# Patient Record
Sex: FEMALE | Race: BLACK OR AFRICAN AMERICAN | NOT HISPANIC OR LATINO
[De-identification: names, ages, dates, MRNs, and addresses within clinical notes are randomized per-mention and may not be internally consistent; named-entity substitution may affect disease eponyms.]

---

## 2024-07-22 ENCOUNTER — RESULT REVIEW (OUTPATIENT)
Age: 39
End: 2024-07-22

## 2024-07-24 ENCOUNTER — TRANSCRIPTION ENCOUNTER (OUTPATIENT)
Age: 39
End: 2024-07-24

## 2024-08-05 ENCOUNTER — NON-APPOINTMENT (OUTPATIENT)
Age: 39
End: 2024-08-05

## 2024-08-06 ENCOUNTER — APPOINTMENT (OUTPATIENT)
Dept: NEUROLOGY | Facility: CLINIC | Age: 39
End: 2024-08-06

## 2024-08-06 PROBLEM — Z00.00 ENCOUNTER FOR PREVENTIVE HEALTH EXAMINATION: Status: ACTIVE | Noted: 2024-08-06

## 2024-08-06 PROCEDURE — G2211 COMPLEX E/M VISIT ADD ON: CPT

## 2024-08-06 PROCEDURE — 99203 OFFICE O/P NEW LOW 30 MIN: CPT

## 2024-08-13 NOTE — PHYSICAL EXAM
[General Appearance - Alert] : alert [General Appearance - In No Acute Distress] : in no acute distress [Oriented To Time, Place, And Person] : oriented to person, place, and time [Impaired Insight] : insight and judgment were intact [Affect] : the affect was normal [Person] : oriented to person [Place] : oriented to place [Time] : oriented to time [Cranial Nerves Optic (II)] : visual acuity intact bilaterally,  visual fields full to confrontation, pupils equal round and reactive to light [Cranial Nerves Oculomotor (III)] : extraocular motion intact [Cranial Nerves Trigeminal (V)] : facial sensation intact symmetrically [Cranial Nerves Facial (VII)] : face symmetrical [Cranial Nerves Vestibulocochlear (VIII)] : hearing was intact bilaterally [Cranial Nerves Glossopharyngeal (IX)] : tongue and palate midline [Cranial Nerves Accessory (XI - Cranial And Spinal)] : head turning and shoulder shrug symmetric [Cranial Nerves Hypoglossal (XII)] : there was no tongue deviation with protrusion [Sensation Tactile Decrease] : light touch was intact [Abnormal Walk] : normal gait [2+] : Brachioradialis left 2+ [Sclera] : the sclera and conjunctiva were normal [Extraocular Movements] : extraocular movements were intact [Outer Ear] : the ears and nose were normal in appearance [Hearing Threshold Finger Rub Not [place]] : hearing was normal [Neck Appearance] : the appearance of the neck was normal [Skin Color & Pigmentation] : normal skin color and pigmentation

## 2024-08-13 NOTE — ASSESSMENT
[FreeTextEntry1] : Ms Ene Azevedo us a 39yo Female w/ HTN, HLD, Lupus, ESRD (MWF) on HD presenting due to first-time seizure activity w/ associated headaches   Plan continue to monitor of ASM reviewed seizure triggers Proscribed [place] law follow up with Dr Fam in 3 months

## 2024-08-13 NOTE — HISTORY OF PRESENT ILLNESS
[FreeTextEntry1] : ***8/6/2024*** Ms Ene Azevedo is here today for an initial visit after hospitalization as per below:   Hospital Course:[facility] Discharge Date 24-Jul-2024 Admission Date 18-Jul-2024 18:13 Reason for Admission Seizure Hospital Course HPI: 37yo Female w/ HTN, HLD, Lupus, ESRD (MWF) on HD presenting due to first-time seizure activity w/ associated headaches. From chart review daughter reports that at around 12:00 PM on 7/18/2024, she witnessed her mother foaming at the mouth with her entire body shaking for about 1 minute. The symptoms stopped without giving any medications. Patient bit her tongue, lost bowel and bladder control and remained confused afterward. When patient awoke, she was still at home. Patient reports feeling frontal headache earlier in day starting at around 9:30 AM so she left work early but denies any feelings that she was going to lose consciousness. Patient states that she currently feels tired. Family friend notes that patient missed HD session on 7/16/2024 so went for HD on 7/17/2024 and missed today's HD session. Prior to this episode, patient reports being in her normal state of health. Upon review of systems patient reports no photophobia, blurry vision, chest pain, palpitations, diarrhea constipation or dysuria. Patient reports no recent travel, no new medications, no toxic substances. Patient does not have any seizure history, no history of febrile seizures, no developmental issues, unknown family history of seizures, and no history of trauma.  At home, patient lives with daughter (11yo) and sister. She works at [employer]. No smoking or alcohol use. Ambulates without assistance.  Upon arrival to the ED: Patient AF, HR 80s, BP 200s/110, RA Patient had a seizure in the ED, requiring ativan. Labetalol 10mg was given for hypertension. (18 Jul 2024 20:05)  Hospital Course: Patient admitted for seizure management iso of Hypertensive emergency. Nephrology was consulted at the ED. Patient underwent emergent dialysis on 7/18/24 and underwent HD as per renal. Hospitalization complicated by difficult to control blood pressure with countless SBP > 180. Patient remained asymptomatic during the course. Patient was initially started on all of her home bp medication including coreg 12.5mg BID, hydralazine 50mg TID, nifedipine 90qd, Torsemide 100mg. BP titration: Coreg was titrated to 25mg BID, hydral 75mg TID with initiation of lisinopril 10mg. Patient remained normotensive thereafter. Neurology was consulted for seizure management. Patient was no in acute lupus flare. EEG showed no epileptiform discharges and MRI Brain seizure protocol was negative for anatomical abnormalities. Patient was discharge with improve blood pressure control with plans to follow up with her nephrologist, and Primary care physician.   Important Medication Changes and Reason: 1. increase coreg from 12.5mg BID to 25 mg BID 2.increase hydralazine from 50mg TID to 100 mg TID 3. start taking lisinopril 10 mg daily  Active or Pending Issues Requiring Follow-up: Continue to go to your dialysis sessions Follow up with your physicians  Advanced Directives: [x] Full code [ ] DNR [ ] Hospice  Discharge Diagnoses: Hypertensive Emergency, seizure     Med Reconciliation: Override IMPROVE-DD recommendations due to: IMPROVE-DD Application Not Available Recommended Post-Discharge VTE Prophylaxis IMPROVE-DD Application Not Available Medication Reconciliation Status Admission Reconciliation is Completed Discharge Reconciliation is Completed Discharge Medications calcium acetate: 667 milligram(s) orally 3 times a day carvedilol 25 mg oral tablet: 1 tab(s) orally every 12 hours hydrALAZINE 100 mg oral tablet: 1 tab(s) orally 3 times a day lisinopril 10 mg oral tablet: 1 tab(s) orally once a day mycophenolate mofetil 500 mg oral tablet: 1 tab(s) orally 2 times a day NIFEdipine 90 mg oral tablet, extended release: 1 tab(s) orally once a day torsemide 100 mg oral tablet: 1 tab(s) orally once a day , ,

## 2024-08-13 NOTE — ASSESSMENT
[FreeTextEntry1] : Ms Ene Azevedo us a 37yo Female w/ HTN, HLD, Lupus, ESRD (MWF) on HD presenting due to first-time seizure activity w/ associated headaches   Plan continue to monitor of ASM reviewed seizure triggers [place] follow up with Dr Fam in 3 months

## 2024-08-13 NOTE — HISTORY OF PRESENT ILLNESS
[FreeTextEntry1] : ***8/6/2024*** Ms Ene Azevedo is here today for an initial visit after hospitalization as per below:   Hospital Course:[facility] Discharge Date 24-Jul-2024 Admission Date 18-Jul-2024 18:13 Reason for Admission Seizure Hospital Course HPI: 39yo Female w/ HTN, HLD, Lupus, ESRD (MWF) on HD presenting due to first-time seizure activity w/ associated headaches. From chart review daughter reports that at around 12:00 PM on 7/18/2024, she witnessed her mother foaming at the mouth with her entire body shaking for about 1 minute. The symptoms stopped without giving any medications. Patient bit her tongue, lost bowel and bladder control and remained confused afterward. When patient awoke, she was still at home. Patient reports feeling frontal headache earlier in day starting at around 9:30 AM so she left work early but denies any feelings that she was going to lose consciousness. Patient states that she currently feels tired. Family friend notes that patient missed HD session on 7/16/2024 so went for HD on 7/17/2024 and missed today's HD session. Prior to this episode, patient reports being in her normal state of health. Upon review of systems patient reports no photophobia, blurry vision, chest pain, palpitations, diarrhea constipation or dysuria. Patient reports no recent travel, no new medications, no toxic substances. Patient does not have any seizure history, no history of febrile seizures, no developmental issues, unknown family history of seizures, and no history of trauma.  At home, patient lives with daughter (9yo) and sister. She works at [employer]. No smoking or alcohol use. Ambulates without assistance.  Upon arrival to the ED: Patient AF, HR 80s, BP 200s/110, RA Patient had a seizure in the ED, requiring ativan. Labetalol 10mg was given for hypertension. (18 Jul 2024 20:05)  Hospital Course: Patient admitted for seizure management iso of Hypertensive emergency. Nephrology was consulted at the ED. Patient underwent emergent dialysis on 7/18/24 and underwent HD as per renal. Hospitalization complicated by difficult to control blood pressure with countless SBP > 180. Patient remained asymptomatic during the course. Patient was initially started on all of her home bp medication including coreg 12.5mg BID, hydralazine 50mg TID, nifedipine 90qd, Torsemide 100mg. BP titration: Coreg was titrated to 25mg BID, hydral 75mg TID with initiation of lisinopril 10mg. Patient remained normotensive thereafter. Neurology was consulted for seizure management. Patient was no in acute lupus flare. EEG showed no epileptiform discharges and MRI Brain seizure protocol was negative for anatomical abnormalities. Patient was discharge with improve blood pressure control with plans to follow up with her nephrologist, and Primary care physician.   Important Medication Changes and Reason: 1. increase coreg from 12.5mg BID to 25 mg BID 2.increase hydralazine from 50mg TID to 100 mg TID 3. start taking lisinopril 10 mg daily  Active or Pending Issues Requiring Follow-up: Continue to go to your dialysis sessions Follow up with your physicians  Advanced Directives: [x] Full code [ ] DNR [ ] Hospice  Discharge Diagnoses: Hypertensive Emergency, seizure     Med Reconciliation: Override IMPROVE-DD recommendations due to: IMPROVE-DD Application Not Available Recommended Post-Discharge VTE Prophylaxis IMPROVE-DD Application Not Available Medication Reconciliation Status Admission Reconciliation is Completed Discharge Reconciliation is Completed Discharge Medications calcium acetate: 667 milligram(s) orally 3 times a day carvedilol 25 mg oral tablet: 1 tab(s) orally every 12 hours hydrALAZINE 100 mg oral tablet: 1 tab(s) orally 3 times a day lisinopril 10 mg oral tablet: 1 tab(s) orally once a day mycophenolate mofetil 500 mg oral tablet: 1 tab(s) orally 2 times a day NIFEdipine 90 mg oral tablet, extended release: 1 tab(s) orally once a day torsemide 100 mg oral tablet: 1 tab(s) orally once a day , ,

## 2024-08-13 NOTE — PHYSICAL EXAM
[General Appearance - Alert] : alert [General Appearance - In No Acute Distress] : in no acute distress [Oriented To Time, Place, And Person] : oriented to person, place, and time [Impaired Insight] : insight and judgment were intact [Affect] : the affect was normal [Person] : oriented to person [Place] : oriented to place [Time] : oriented to time [Cranial Nerves Optic (II)] : visual acuity intact bilaterally,  visual fields full to confrontation, pupils equal round and reactive to light [Cranial Nerves Oculomotor (III)] : extraocular motion intact [Cranial Nerves Trigeminal (V)] : facial sensation intact symmetrically [Cranial Nerves Facial (VII)] : face symmetrical [Cranial Nerves Vestibulocochlear (VIII)] : hearing was intact bilaterally [Cranial Nerves Glossopharyngeal (IX)] : tongue and palate midline [Cranial Nerves Accessory (XI - Cranial And Spinal)] : head turning and shoulder shrug symmetric [Cranial Nerves Hypoglossal (XII)] : there was no tongue deviation with protrusion [Abnormal Walk] : normal gait [Sensation Tactile Decrease] : light touch was intact [2+] : Brachioradialis left 2+ [Sclera] : the sclera and conjunctiva were normal [Extraocular Movements] : extraocular movements were intact [Outer Ear] : the ears and nose were normal in appearance [Hearing Threshold Finger Rub Not [place]] : hearing was normal [Neck Appearance] : the appearance of the neck was normal [Skin Color & Pigmentation] : normal skin color and pigmentation

## 2024-09-23 ENCOUNTER — APPOINTMENT (OUTPATIENT)
Dept: NEUROLOGY | Facility: CLINIC | Age: 39
End: 2024-09-23
Payer: COMMERCIAL

## 2024-09-23 VITALS
WEIGHT: 108 LBS | DIASTOLIC BLOOD PRESSURE: 75 MMHG | BODY MASS INDEX: 19.38 KG/M2 | HEIGHT: 62.5 IN | HEART RATE: 62 BPM | SYSTOLIC BLOOD PRESSURE: 144 MMHG

## 2024-09-23 DIAGNOSIS — I10 ESSENTIAL (PRIMARY) HYPERTENSION: ICD-10-CM

## 2024-09-23 DIAGNOSIS — R56.9 UNSPECIFIED CONVULSIONS: ICD-10-CM

## 2024-09-23 DIAGNOSIS — I15.1 HYPERTENSION SECONDARY TO OTHER RENAL DISORDERS: ICD-10-CM

## 2024-09-23 PROCEDURE — 99213 OFFICE O/P EST LOW 20 MIN: CPT

## 2024-09-23 PROCEDURE — G2211 COMPLEX E/M VISIT ADD ON: CPT

## 2024-09-25 PROBLEM — I10 HYPERTENSION: Status: ACTIVE | Noted: 2024-09-25

## 2024-09-25 PROBLEM — I15.1 HYPERTENSION SECONDARY TO OTHER RENAL DISORDERS: Status: ACTIVE | Noted: 2024-09-25

## 2024-09-25 PROBLEM — R56.9 SEIZURE: Status: ACTIVE | Noted: 2024-09-25

## 2024-09-25 RX ORDER — LISINOPRIL 10 MG/1
10 TABLET ORAL
Qty: 60 | Refills: 0 | Status: ACTIVE | COMMUNITY
Start: 2024-08-13

## 2024-09-25 RX ORDER — CALCIUM ACETATE 667 MG/1
667 CAPSULE ORAL
Qty: 270 | Refills: 0 | Status: ACTIVE | COMMUNITY
Start: 2024-08-31

## 2024-09-25 RX ORDER — CARVEDILOL 12.5 MG/1
12.5 TABLET, FILM COATED ORAL
Qty: 180 | Refills: 0 | Status: ACTIVE | COMMUNITY
Start: 2024-08-13

## 2024-09-25 RX ORDER — NIFEDIPINE 90 MG/1
90 TABLET, EXTENDED RELEASE ORAL
Qty: 44 | Refills: 0 | Status: ACTIVE | COMMUNITY
Start: 2024-03-26

## 2024-09-25 RX ORDER — HYDRALAZINE HYDROCHLORIDE 50 MG/1
50 TABLET ORAL
Qty: 270 | Refills: 0 | Status: ACTIVE | COMMUNITY
Start: 2024-09-03

## 2024-09-25 NOTE — HISTORY OF PRESENT ILLNESS
[FreeTextEntry1] : Current meds: Carvedilol Nifedipine  Hydralazine  *** 09/23/2024*** ANNIE AZEVEDO 40yo LHF w/ HTN, Lupus, ESRD (MWF here for a follow up visit, .accompanied by daughter Pam. prev seen on August 2024 by Dorita Cope NP.  Have 2 episodes July 2024, one home, second in ER sec to Hypertensive Emergency.  She works at [employer]. No smoking or alcohol use.  No everts since last seen,    ***8/6/2024*** w/Dorita Longoria NP.  Ms Annie Azevedo is here today for an initial visit after hospitalization as per below:  Hospital Course:[facility] Discharge Date 24-Jul-2024 Admission Date 18-Jul-2024 18:13 Reason for Admission Seizure Hospital Course HPI: 39yo Female w/ HTN, HLD, Lupus, ESRD (MWF) on HD presenting due to first-time seizure activity w/ associated headaches. From chart review daughter reports that at around 12:00 PM on 7/18/2024, she witnessed her mother foaming at the mouth with her entire body shaking for about 1 minute. The symptoms stopped without giving any medications. Patient bit her tongue, lost bowel and bladder control and remained confused afterward. When patient awoke, she was still at home. Patient reports feeling frontal headache earlier in day starting at around 9:30 AM so she left work early but denies any feelings that she was going to lose consciousness. Patient states that she currently feels tired. Family friend notes that patient missed HD session on 7/16/2024 so went for HD on 7/17/2024 and missed today's HD session. Prior to this episode, patient reports being in her normal state of health. Upon review of systems patient reports no photophobia, blurry vision, chest pain, palpitations, diarrhea constipation or dysuria. Patient reports no recent travel, no new medications, no toxic substances.   Patient does not have any seizure history, no history of febrile seizures, no developmental issues, unknown family history of seizures, and no history of trauma. At home, patient lives with daughter (10yo) and sister. She works at [employer]. No smoking or alcohol use. Ambulates without assistance.  Upon arrival to the ED: Patient AF, HR 80s, BP 200s/110, RA Patient had a seizure in the ED, requiring ativan. Labetalol 10mg was given for hypertension. (18 Jul 2024 20:05)  Hospital Course: Patient admitted for seizure management iso of Hypertensive emergency. Nephrology was consulted at the ED. Patient underwent emergent dialysis on 7/18/24 and underwent HD as per renal. Hospitalization complicated by difficult to control blood pressure with countless SBP > 180. Patient remained asymptomatic during the course. Patient was initially started on all of her home bp medication including coreg 12.5mg BID, hydralazine 50mg TID, nifedipine 90qd, Torsemide 100mg. BP titration: Coreg was titrated to 25mg BID, hydral 75mg TID with initiation of lisinopril 10mg. Patient remained normotensive thereafter. Neurology was consulted for seizure management. Patient was no in acute lupus flare. EEG showed no epileptiform discharges and MRI Brain seizure protocol was negative for anatomical abnormalities. Patient was discharge with improve blood pressure control with plans to follow up with her nephrologist, and Primary care physician  Discharge Diagnoses: Hypertensive Emergency, seizure Risk Factors no family hx of seizures no hx of head trauma with LOC No hx of CNS infection No hx of febrile seizures in childhood

## 2024-09-25 NOTE — HISTORY OF PRESENT ILLNESS
[FreeTextEntry1] : Current meds: Carvedilol Nifedipine  Hydralazine  *** 09/23/2024*** ANNIE AZEVEDO 38yo LHF w/ HTN, Lupus, ESRD (MWF here for a follow up visit, .accompanied by daughter Pam. prev seen on August 2024 by Dorita Cope NP.  Have 2 episodes July 2024, one home, second in ER sec to Hypertensive Emergency.  She works at [employer]. No smoking or alcohol use.  No everts since last seen,    ***8/6/2024*** w/Dorita Longoria NP.  Ms Annie Azevedo is here today for an initial visit after hospitalization as per below:  Hospital Course:[facility] Discharge Date 24-Jul-2024 Admission Date 18-Jul-2024 18:13 Reason for Admission Seizure Hospital Course HPI: 39yo Female w/ HTN, HLD, Lupus, ESRD (MWF) on HD presenting due to first-time seizure activity w/ associated headaches. From chart review daughter reports that at around 12:00 PM on 7/18/2024, she witnessed her mother foaming at the mouth with her entire body shaking for about 1 minute. The symptoms stopped without giving any medications. Patient bit her tongue, lost bowel and bladder control and remained confused afterward. When patient awoke, she was still at home. Patient reports feeling frontal headache earlier in day starting at around 9:30 AM so she left work early but denies any feelings that she was going to lose consciousness. Patient states that she currently feels tired. Family friend notes that patient missed HD session on 7/16/2024 so went for HD on 7/17/2024 and missed today's HD session. Prior to this episode, patient reports being in her normal state of health. Upon review of systems patient reports no photophobia, blurry vision, chest pain, palpitations, diarrhea constipation or dysuria. Patient reports no recent travel, no new medications, no toxic substances.   Patient does not have any seizure history, no history of febrile seizures, no developmental issues, unknown family history of seizures, and no history of trauma. At home, patient lives with daughter (12yo) and sister. She works at [employer]. No smoking or alcohol use. Ambulates without assistance.  Upon arrival to the ED: Patient AF, HR 80s, BP 200s/110, RA Patient had a seizure in the ED, requiring ativan. Labetalol 10mg was given for hypertension. (18 Jul 2024 20:05)  Hospital Course: Patient admitted for seizure management iso of Hypertensive emergency. Nephrology was consulted at the ED. Patient underwent emergent dialysis on 7/18/24 and underwent HD as per renal. Hospitalization complicated by difficult to control blood pressure with countless SBP > 180. Patient remained asymptomatic during the course. Patient was initially started on all of her home bp medication including coreg 12.5mg BID, hydralazine 50mg TID, nifedipine 90qd, Torsemide 100mg. BP titration: Coreg was titrated to 25mg BID, hydral 75mg TID with initiation of lisinopril 10mg. Patient remained normotensive thereafter. Neurology was consulted for seizure management. Patient was no in acute lupus flare. EEG showed no epileptiform discharges and MRI Brain seizure protocol was negative for anatomical abnormalities. Patient was discharge with improve blood pressure control with plans to follow up with her nephrologist, and Primary care physician  Discharge Diagnoses: Hypertensive Emergency, seizure Risk Factors no family hx of seizures no hx of head trauma with LOC No hx of CNS infection No hx of febrile seizures in childhood

## 2024-09-25 NOTE — ASSESSMENT
[FreeTextEntry1] : Ms Ene Aezvedo us a 40yo Female w/ HTN, HLD, Lupus, ESRD (MWF) on HD h.o 2 sz in July 2024.  Etiology likely metabolic is of hypertensive emergency vs metabolic derangements from ESRD.    Plan - continue to monitor of ASM - follow up with nephrol as schedulled  - reviewed seizure trigger -  all questions answered - Pres[place] law   - follow up prn   x24 min

## 2024-09-25 NOTE — PHYSICAL EXAM
[General Appearance - Alert] : alert [General Appearance - In No Acute Distress] : in no acute distress [Oriented To Time, Place, And Person] : oriented to person, place, and time [Impaired Insight] : insight and judgment were intact [Affect] : the affect was normal [Person] : oriented to person [Place] : oriented to place [Time] : oriented to time [Cranial Nerves Optic (II)] : visual acuity intact bilaterally,  visual fields full to confrontation, pupils equal round and reactive to light [Cranial Nerves Oculomotor (III)] : extraocular motion intact [Cranial Nerves Trigeminal (V)] : facial sensation intact symmetrically [Cranial Nerves Facial (VII)] : face symmetrical [Cranial Nerves Vestibulocochlear (VIII)] : hearing was intact bilaterally [Cranial Nerves Glossopharyngeal (IX)] : tongue and palate midline [Cranial Nerves Accessory (XI - Cranial And Spinal)] : head turning and shoulder shrug symmetric [Sensation Tactile Decrease] : light touch was intact [Cranial Nerves Hypoglossal (XII)] : there was no tongue deviation with protrusion [Sclera] : the sclera and conjunctiva were normal [Extraocular Movements] : extraocular movements were intact [Outer Ear] : the ears and nose were normal in appearance [Hearing Threshold Finger Rub Not [place]] : hearing was normal [Neck Appearance] : the appearance of the neck was normal [Skin Color & Pigmentation] : normal skin color and pigmentation [2+] : Patella left 2+ [Abnormal Walk] : normal gait [Musculoskeletal - Swelling] : no joint swelling seen [] : no rash

## 2024-09-25 NOTE — DATA REVIEWED
[de-identified] : 07/22/2024 MRI brain  No acute infarction. No evidence for cortical dysplasia, gray matter heterotopia, or mesial temporal sclerosis. Partially empty sella.

## 2024-09-25 NOTE — ASSESSMENT
[FreeTextEntry1] : Ms Ene Azevedo us a 38yo Female w/ HTN, HLD, Lupus, ESRD (MWF) on HD h.o 2 sz in July 2024.  Etiology likely metabolic is of hypertensive emergency vs metabolic derangements from ESRD.    Plan - continue to monitor of ASM - follow up with nephrol as schedulled  - reviewed seizure trigger -  all questions answered - [place]   - follow up prn   x24 min

## 2024-09-25 NOTE — DATA REVIEWED
[de-identified] : 07/22/2024 MRI brain  No acute infarction. No evidence for cortical dysplasia, gray matter heterotopia, or mesial temporal sclerosis. Partially empty sella.

## 2024-09-25 NOTE — PHYSICAL EXAM
[General Appearance - Alert] : alert [General Appearance - In No Acute Distress] : in no acute distress [Oriented To Time, Place, And Person] : oriented to person, place, and time [Impaired Insight] : insight and judgment were intact [Affect] : the affect was normal [Person] : oriented to person [Place] : oriented to place [Time] : oriented to time [Cranial Nerves Optic (II)] : visual acuity intact bilaterally,  visual fields full to confrontation, pupils equal round and reactive to light [Cranial Nerves Oculomotor (III)] : extraocular motion intact [Cranial Nerves Trigeminal (V)] : facial sensation intact symmetrically [Cranial Nerves Facial (VII)] : face symmetrical [Cranial Nerves Vestibulocochlear (VIII)] : hearing was intact bilaterally [Cranial Nerves Glossopharyngeal (IX)] : tongue and palate midline [Cranial Nerves Accessory (XI - Cranial And Spinal)] : head turning and shoulder shrug symmetric [Cranial Nerves Hypoglossal (XII)] : there was no tongue deviation with protrusion [Sensation Tactile Decrease] : light touch was intact [Sclera] : the sclera and conjunctiva were normal [Extraocular Movements] : extraocular movements were intact [Outer Ear] : the ears and nose were normal in appearance [Hearing Threshold Finger Rub Not [place]] : hearing was normal [Neck Appearance] : the appearance of the neck was normal [Skin Color & Pigmentation] : normal skin color and pigmentation [2+] : Patella left 2+ [Abnormal Walk] : normal gait [Musculoskeletal - Swelling] : no joint swelling seen [] : no rash

## 2024-09-30 ENCOUNTER — APPOINTMENT (OUTPATIENT)
Dept: NEUROLOGY | Facility: CLINIC | Age: 39
End: 2024-09-30